# Patient Record
Sex: FEMALE | Race: WHITE | HISPANIC OR LATINO | URBAN - METROPOLITAN AREA
[De-identification: names, ages, dates, MRNs, and addresses within clinical notes are randomized per-mention and may not be internally consistent; named-entity substitution may affect disease eponyms.]

---

## 2023-08-15 ENCOUNTER — EMERGENCY (EMERGENCY)
Facility: HOSPITAL | Age: 63
LOS: 1 days | Discharge: HOME | End: 2023-08-15
Attending: STUDENT IN AN ORGANIZED HEALTH CARE EDUCATION/TRAINING PROGRAM | Admitting: STUDENT IN AN ORGANIZED HEALTH CARE EDUCATION/TRAINING PROGRAM
Payer: COMMERCIAL

## 2023-08-15 VITALS
HEIGHT: 60 IN | HEART RATE: 84 BPM | OXYGEN SATURATION: 94 % | WEIGHT: 195.99 LBS | DIASTOLIC BLOOD PRESSURE: 100 MMHG | SYSTOLIC BLOOD PRESSURE: 195 MMHG | RESPIRATION RATE: 18 BRPM | TEMPERATURE: 98 F

## 2023-08-15 VITALS
HEART RATE: 67 BPM | TEMPERATURE: 98 F | RESPIRATION RATE: 18 BRPM | DIASTOLIC BLOOD PRESSURE: 80 MMHG | SYSTOLIC BLOOD PRESSURE: 158 MMHG | OXYGEN SATURATION: 95 %

## 2023-08-15 PROCEDURE — 73562 X-RAY EXAM OF KNEE 3: CPT

## 2023-08-15 PROCEDURE — 99285 EMERGENCY DEPT VISIT HI MDM: CPT

## 2023-08-15 PROCEDURE — 73562 X-RAY EXAM OF KNEE 3: CPT | Mod: 26,LT

## 2023-08-15 PROCEDURE — 99284 EMERGENCY DEPT VISIT MOD MDM: CPT | Mod: 25

## 2023-08-15 PROCEDURE — 70486 CT MAXILLOFACIAL W/O DYE: CPT | Mod: MA

## 2023-08-15 PROCEDURE — 72125 CT NECK SPINE W/O DYE: CPT | Mod: 26,MA

## 2023-08-15 PROCEDURE — 73030 X-RAY EXAM OF SHOULDER: CPT | Mod: 26,RT

## 2023-08-15 PROCEDURE — 72125 CT NECK SPINE W/O DYE: CPT | Mod: MA

## 2023-08-15 PROCEDURE — 70450 CT HEAD/BRAIN W/O DYE: CPT | Mod: MA

## 2023-08-15 PROCEDURE — 73030 X-RAY EXAM OF SHOULDER: CPT

## 2023-08-15 PROCEDURE — 70450 CT HEAD/BRAIN W/O DYE: CPT | Mod: 26,MA

## 2023-08-15 PROCEDURE — 70486 CT MAXILLOFACIAL W/O DYE: CPT | Mod: 26,MA

## 2023-08-15 RX ORDER — IBUPROFEN 200 MG
600 TABLET ORAL ONCE
Refills: 0 | Status: COMPLETED | OUTPATIENT
Start: 2023-08-15 | End: 2023-08-15

## 2023-08-15 RX ADMIN — Medication 600 MILLIGRAM(S): at 13:54

## 2023-08-15 NOTE — ED PROVIDER NOTE - PATIENT PORTAL LINK FT
You can access the FollowMyHealth Patient Portal offered by [facility] by registering at the following website: [email or web address]. By joining [employer]’s FollowMyHealth portal, you will also be able to view your health information using other applications (apps) compatible with our system.

## 2023-08-15 NOTE — ED PROVIDER NOTE - CARE PLAN
Principal Discharge DX:	CHI (closed head injury), initial encounter  Secondary Diagnosis:	Fall  Secondary Diagnosis:	Right shoulder pain  Secondary Diagnosis:	Left knee pain   1

## 2023-08-15 NOTE — ED PROVIDER NOTE - OBJECTIVE STATEMENT
63-year-old female with past medical history of diabetes, hypertension, thyroid disease, arthritis, high cholesterol presents to the ED with complaint of right facial pain, right shoulder pain and left knee pain status post mechanical trip and fall in the bathtub today.  Patient is visiting from [place] and staying with her daughter, reports she tripped on the tub and fell hitting her right side. She denies any LOC, anticoagulation use, chest pain, shortness of breath, dizziness or symptoms prior to the fall.  Patient is able to ambulate and bear weight.  Reports she took Tylenol prior to arrival which helped a little with the pain

## 2023-08-15 NOTE — ED PROVIDER NOTE - CLINICAL SUMMARY MEDICAL DECISION MAKING FREE TEXT BOX
63-year-old female with past medical history of diabetes, hypertension, thyroid disease, arthritis, high cholesterol presents to the ED with complaint of right facial pain, right shoulder pain and left knee pain status post mechanical trip and fall in the bathtub today.  Patient is visiting from [place] and staying with her daughter, reports she tripped on the tub and fell hitting her right side. She denies any LOC, anticoagulation use, chest pain, shortness of breath, dizziness or symptoms prior to the fall.  Patient is able to ambulate and bear weight.  Reports she took Tylenol prior to arrival which helped a little with the pain. PE as noted above. CT results neg. XR images reviewed- no fx. will dc 63-year-old female with past medical history of diabetes, hypertension, thyroid disease, arthritis, high cholesterol presents to the ED with complaint of right facial pain, right shoulder pain and left knee pain status post mechanical trip and fall in the bathtub today.  Patient is visiting from [place] and staying with her daughter, reports she tripped on the tub and fell hitting her right side. She denies any LOC, anticoagulation use, chest pain, shortness of breath, dizziness or symptoms prior to the fall.  Patient is able to ambulate and bear weight.  Reports she took Tylenol prior to arrival which helped a little with the pain. PE as noted above. CT results neg. XR images reviewed- no fx. will dc    Attending Mary: 62 y/o F w/ PMH of HTN, hypothyroid, DM, HTN, HLD presenting w/ fall. Seen w/ daughter. Agree w/ above documented HPI/PE/MDM. Pt w/ mechanical trip and fall. Hit R side of head, R shoulder, L knee. No LOC. No AC use. Overall well appearing on exam, no acute distress. Neuro intact. No midline spinal tenderness. Bruising to R cheek. RUE FROM, NVI. LLE FROM, NVI. Will obtain CTs and xrays to r/o traumatic injuries. Likely only soft tissue injury such as contusions. Possible strain vs. sprain. Do not suspect fracture. Do not suspect ICH. Will reassess the need for additional interventions as clinically warranted. Refer to any progress notes for updates on clinical course and as a continuation of this MDM.

## 2023-08-15 NOTE — ED ADULT TRIAGE NOTE - CHIEF COMPLAINT QUOTE
Pt had a slip and fall in the bath tub. C/O right side of face, neck, and left leg pain. Denies LOC or takes blood thinners.

## 2023-08-15 NOTE — ED PROVIDER NOTE - ATTENDING APP SHARED VISIT CONTRIBUTION OF CARE
Attending Mary: I performed a history and physical exam of the patient and discussed their management with the ASH. I have reviewed the ASH note and agree with the documented findings and plan of care, except as noted. This was a shared visit with an ASH. I reviewed and verified the documentation and independently performed my own history/exam/medical decision making. My medical decision making and observations are found above. Please refer to any progress notes for updates on clinical course. My notes supersedes the above ACP note in case of discrepancy

## 2023-08-15 NOTE — ED PROVIDER NOTE - NSFOLLOWUPCLINICS_GEN_ALL_ED_FT
[facility]  Family Medicine  [address] [place] [id number]  Phone: [phone]  Fax:

## 2023-08-15 NOTE — ED PROVIDER NOTE - NSFOLLOWUPINSTRUCTIONS_ED_ALL_ED_FT
1. Alternate between Tylenol and motrin as needed for pain  2. Follow up with your doctor in 1-2 days  3. Expect some nausea, vomiting, headache, dizziness and blurry vision  4. Return to the ED for persistent vomiting, weakness or numbness on one side of the body, loss of consciousness or any concerns.    Closed Head Injury    A closed head injury is an injury to your head that may or may not involve a traumatic brain injury (TBI). Symptoms of TBI can be short or long lasting and include headache, dizziness, interference with memory or speech, fatigue, confusion, changes in sleep, mood changes, nausea, depression/anxiety, and dulling of senses. Make sure to obtain proper rest which includes getting plenty of sleep, avoiding excessive visual stimulation, and avoiding activities that may cause physical or mental stress. Avoid any situation where there is potential for another head injury, including sports.    SEEK IMMEDIATE MEDICAL CARE IF YOU HAVE ANY OF THE FOLLOWING SYMPTOMS: unusual drowsiness, vomiting, severe dizziness, seizures, lightheadedness, muscular weakness, different pupil sizes, visual changes, or clear or bloody discharge from your ears or nose.  *******************    Fall Prevention for Older Adults    WHAT YOU NEED TO KNOW:    As you age, your muscles weaken and your risk for falls increases. Your risk also increases if you take medicines that make you sleepy or dizzy. You may also be at risk if you have vision or joint problems, have low blood pressure, or are not active.    DISCHARGE INSTRUCTIONS:    Call 911 or have someone else call if:   •You have fallen and are unconscious.      •You have fallen and cannot move part of your body.      Contact your healthcare provider if:   •You have fallen and have pain or a headache.      •You have questions or concerns about your condition or care.      Fall prevention tips:   •Stay active. Exercise can help strengthen your muscles and improve your balance. Your healthcare provider may recommend water aerobics, walking, or [employer]. He or she may also recommend physical therapy to improve your coordination. Never start an exercise program without asking your healthcare provider first.  Water Aerobics for Seniors       [employer] for Seniors           •Wear shoes that fit well and have soles that . Wear shoes both inside and outside. Use slippers with good . Avoid shoes with high heels.      •Use assistive devices as directed. Your healthcare provider may suggest that you use a cane or walker to help you keep your balance. You may need to have grab bars put in your bathroom near the toilet or in the shower.      •Stand or sit up slowly. This may help you keep your balance and prevent falls.      •Wear a personal alarm. This is a device that allows you to call 911 if you need help. Ask for more information on personal alarms.      •Manage your medical conditions.  Keep all appointments with your healthcare providers. Visit your eye doctor as directed.      Home safety tips:     Fall Prevention for Seniors     •Add items to prevent falls in the bathroom. Put nonslip strips on your bath or shower floor to prevent you from slipping. Use a bath mat if you do not have carpet in the bathroom. This will prevent you from falling when you step out of the bath or shower. Use a shower seat so you do not need to stand while you shower. Sit on the toilet or a chair in your bathroom to dry yourself and put on clothing. This will prevent you from losing your balance from drying or dressing yourself while you are standing.      •Keep paths clear. Remove books, shoes, and other objects from walkways and stairs. Place cords for telephones and lamps out of the way so that you do not need to walk over them. Tape them down if you cannot move them. Remove small rugs. If you cannot remove a rug, secure it with double-sided tape. This will prevent you from tripping.      •Install bright lights in your home. Use night lights to help light paths to the bathroom or kitchen. Always turn on the light before you start walking.      •Keep items you use often on shelves within reach. Do not use a step stool to help you reach an item.      •Paint or place reflective tape on the edges of your stairs. This will help you see the stairs better.      Follow up with your healthcare provider as directed: Write down your questions so you remember to ask them during your visits.

## 2023-08-15 NOTE — ED PROVIDER NOTE - PHYSICAL EXAMINATION
Gen: Well appearing in NAD.   Eyes: PERRLA  ENT: oral mucosa moist   Head/face: head atraumatic. +bruising with tenderness to palpation on the right cheekbone.  Heart: s1/s2, RRR  Lung: CTA b/l, no rib TTP  Abd: soft, NT/ND, no rebound or guarding  Msk: Mild paracervical muscle tenderness to palpation bilaterally.  No midline tenderness.  + mild tenderness of the right shoulder with range of motion.  No obvious deformity.  Pelvis stable.  Full range of motion of hips bilaterally.  Mild medial left knee tenderness to palpation.  Neuro: AAO x3, patient moving all extremity equally,  Skin: see above.   Psych: Alert and oriented
